# Patient Record
Sex: FEMALE | Race: WHITE | NOT HISPANIC OR LATINO | Employment: UNEMPLOYED | URBAN - METROPOLITAN AREA
[De-identification: names, ages, dates, MRNs, and addresses within clinical notes are randomized per-mention and may not be internally consistent; named-entity substitution may affect disease eponyms.]

---

## 2017-04-19 ENCOUNTER — OFFICE VISIT (OUTPATIENT)
Dept: SLEEP MEDICINE | Facility: HOSPITAL | Age: 62
End: 2017-04-19

## 2017-04-19 VITALS
HEIGHT: 63 IN | DIASTOLIC BLOOD PRESSURE: 78 MMHG | BODY MASS INDEX: 31.89 KG/M2 | HEART RATE: 70 BPM | OXYGEN SATURATION: 94 % | WEIGHT: 180 LBS | SYSTOLIC BLOOD PRESSURE: 146 MMHG

## 2017-04-19 DIAGNOSIS — G47.33 OBSTRUCTIVE SLEEP APNEA, ADULT: Primary | ICD-10-CM

## 2017-04-19 PROCEDURE — 99213 OFFICE O/P EST LOW 20 MIN: CPT | Performed by: INTERNAL MEDICINE

## 2017-04-19 RX ORDER — PREDNISONE 10 MG/1
TABLET ORAL
COMMUNITY
Start: 2017-02-22

## 2017-04-19 NOTE — PATIENT INSTRUCTIONS
Sleep Apnea  Sleep apnea is a condition in which breathing pauses or becomes shallow during sleep. Episodes of sleep apnea usually last 10 seconds or longer, and they may occur as many as 20 times an hour. Sleep apnea disrupts your sleep and keeps your body from getting the rest that it needs. This condition can increase your risk of certain health problems, including:  · Heart attack.  · Stroke.  · Obesity.  · Diabetes.  · Heart failure.  · Irregular heartbeat.  There are three kinds of sleep apnea:  · Obstructive sleep apnea. This kind is caused by a blocked or collapsed airway.  · Central sleep apnea. This kind happens when the part of the brain that controls breathing does not send the correct signals to the muscles that control breathing.  · Mixed sleep apnea. This is a combination of obstructive and central sleep apnea.  CAUSES  The most common cause of this condition is a collapsed or blocked airway. An airway can collapse or become blocked if:  · Your throat muscles are abnormally relaxed.  · Your tongue and tonsils are larger than normal.  · You are overweight.  · Your airway is smaller than normal.  RISK FACTORS  This condition is more likely to develop in people who:  · Are overweight.  · Smoke.  · Have a smaller than normal airway.  · Are elderly.  · Are male.  · Drink alcohol.  · Take sedatives or tranquilizers.  · Have a family history of sleep apnea.  SYMPTOMS  Symptoms of this condition include:  · Trouble staying asleep.  · Daytime sleepiness and tiredness.  · Irritability.  · Loud snoring.  · Morning headaches.  · Trouble concentrating.  · Forgetfulness.  · Decreased interest in sex.  · Unexplained sleepiness.  · Mood swings.  · Personality changes.  · Feelings of depression.  · Waking up often during the night to urinate.  · Dry mouth.  · Sore throat.  DIAGNOSIS  This condition may be diagnosed with:  · A medical history.  · A physical exam.  · A series of tests that are done while you are  sleeping (sleep study). These tests are usually done in a sleep lab, but they may also be done at home.  TREATMENT  Treatment for this condition aims to restore normal breathing and to ease symptoms during sleep. It may involve managing health issues that can affect breathing, such as high blood pressure or obesity. Treatment may include:  · Sleeping on your side.  · Using a decongestant if you have nasal congestion.  · Avoiding the use of depressants, including alcohol, sedatives, and narcotics.  · Losing weight if you are overweight.  · Making changes to your diet.  · Quitting smoking.  · Using a device to open your airway while you sleep, such as:    An oral appliance. This is a custom-made mouthpiece that shifts your lower jaw forward.    A continuous positive airway pressure (CPAP) device. This device delivers oxygen to your airway through a mask.    A nasal expiratory positive airway pressure (EPAP) device. This device has valves that you put into each nostril.    A bi-level positive airway pressure (BPAP) device. This device delivers oxygen to your airway through a mask.  · Surgery if other treatments do not work. During surgery, excess tissue is removed to create a wider airway.  It is important to get treatment for sleep apnea. Without treatment, this condition can lead to:  · High blood pressure.  · Coronary artery disease.  · (Men) An inability to achieve or maintain an erection (impotence).  · Reduced thinking abilities.  HOME CARE INSTRUCTIONS  · Make any lifestyle changes that your health care provider recommends.  · Eat a healthy, well-balanced diet.  · Take over-the-counter and prescription medicines only as told by your health care provider.  · Avoid using depressants, including alcohol, sedatives, and narcotics.  · Take steps to lose weight if you are overweight.  · If you were given a device to open your airway while you sleep, use it only as told by your health care provider.  · Do not use any  tobacco products, such as cigarettes, chewing tobacco, and e-cigarettes. If you need help quitting, ask your health care provider.  · Keep all follow-up visits as told by your health care provider. This is important.  SEEK MEDICAL CARE IF:  · The device that you received to open your airway during sleep is uncomfortable or does not seem to be working.  · Your symptoms do not improve.  · Your symptoms get worse.  SEEK IMMEDIATE MEDICAL CARE IF:  · You develop chest pain.  · You develop shortness of breath.  · You develop discomfort in your back, arms, or stomach.  · You have trouble speaking.  · You have weakness on one side of your body.  · You have drooping in your face.  These symptoms may represent a serious problem that is an emergency. Do not wait to see if the symptoms will go away. Get medical help right away. Call your local emergency services (911 in the [place] Do not drive yourself to the hospital.     This information is not intended to replace advice given to you by your health care provider. Make sure you discuss any questions you have with your health care provider.     Document Released: 12/08/2003 Document Revised: 01/13/2017 Document Reviewed: 09/26/2016  [employer] Patient Education ©2016 [employer]

## 2017-04-19 NOTE — PROGRESS NOTES
"Subjective   Cecilia Salcedo is a 61 y.o. female is here today for follow-up.  She is seen follow-up for severe obstructive sleep apnea.  Her primary care physician is now Dr. Felix Erickson    History of Present Illness  Patient was last seen in this clinic June 13, 2016.  She did sound have severe obstructive sleep apnea on her previous sleep study she also had periodic limb movement disorder and obesity.  She's been using her machine says she's doing fairly well with that she has difficulty wearing the mask when she is congested.  She uses a nasal mask.  She says when she sleeping at night she gets hot due to her mattress and sometimes causes her to awaken.  The following portions of the patient's history were reviewed and updated as appropriate: allergies, current medications and problem list.    Review of Systems   Constitutional: Negative.    HENT: Positive for congestion, postnasal drip and sinus pressure.    Eyes: Positive for visual disturbance.   Respiratory: Negative.    Cardiovascular: Negative.    Gastrointestinal: Negative.    Endocrine: Negative.    Genitourinary: Negative.    Musculoskeletal: Positive for arthralgias and joint swelling.   Skin: Negative.    Allergic/Immunologic: Positive for environmental allergies.   Neurological: Negative.    Hematological: Negative.    Psychiatric/Behavioral: Negative.        Objective  /78  Pulse 70  Ht 63\" (160 cm)  Wt 180 lb (81.6 kg)  SpO2 94%  BMI 31.89 kg/m2    Physical Exam   Constitutional: She is oriented to person, place, and time. She appears well-developed and well-nourished.   She is obese.   HENT:   Head: Normocephalic and atraumatic.   His nasal airway narrowing and Mallampati class IV anatomy   Eyes: EOM are normal. Pupils are equal, round, and reactive to light.   Neck: Normal range of motion. Neck supple.   Cardiovascular: Normal rate, regular rhythm and normal heart sounds.    Pulmonary/Chest: Effort normal and breath sounds normal. "   Abdominal: Soft. Bowel sounds are normal.   Musculoskeletal: Normal range of motion. She exhibits no edema.   Neurological: She is alert and oriented to person, place, and time.   Skin: Skin is warm and dry.   Psychiatric: She has a normal mood and affect. Her behavior is normal.     Download from her machine for the past 6 months shows usage 88.3% the time.  She is using it 6 hours 26 minutes per day.  Her 90% pressure is 12.3 her AHI is normal at 3.7.    Assessment/Plan   Cecilia was seen today for follow-up.    Diagnoses and all orders for this visit:    Obstructive sleep apnea, adult  -     CPAP Therapy    Patient seems doing fairly well with her machine we will continue on her current pressure settings.  We will order new supplies.  She is unhappy with her current DME company and his asking about changing to an another supplier.  I told her we would be happy to facilitate that if possible.  We will order new supplies.  We will see her back in 1 year.  She is encouraged to lose weight.  She is encouraged to avoid alcohol and sedatives close to bedtime.  She is encouraged practice lateral position sleep.  She is planning a trip abroad later this year and did not wish to take her CPAP machine.  I've encouraged her to practice strict lateral position sleep and to talk to her dentist about getting an oral appliance.    Tae Morris MD [facility]  Sleep Medicine  Pulmonary and Critical Care Medicine

## 2017-04-21 ENCOUNTER — DOCUMENTATION (OUTPATIENT)
Dept: SLEEP MEDICINE | Facility: HOSPITAL | Age: 62
End: 2017-04-21

## 2017-04-21 NOTE — PROGRESS NOTES
When patient was in office she informed us she would like to switch DME companies. Sent new patient order to [facility].

## 2018-01-05 ENCOUNTER — TRANSCRIBE ORDERS (OUTPATIENT)
Dept: LAB | Facility: HOSPITAL | Age: 63
End: 2018-01-05

## 2018-01-05 ENCOUNTER — LAB (OUTPATIENT)
Dept: LAB | Facility: HOSPITAL | Age: 63
End: 2018-01-05

## 2018-01-05 DIAGNOSIS — Z12.31 VISIT FOR SCREENING MAMMOGRAM: ICD-10-CM

## 2018-01-05 DIAGNOSIS — R31.9 HEMATURIA, UNSPECIFIED TYPE: ICD-10-CM

## 2018-01-05 DIAGNOSIS — R31.9 HEMATURIA, UNSPECIFIED TYPE: Primary | ICD-10-CM

## 2018-01-05 LAB
BACTERIA UR QL AUTO: NORMAL /HPF
BILIRUB UR QL STRIP: NEGATIVE
CLARITY UR: CLEAR
COLOR UR: YELLOW
GLUCOSE UR STRIP-MCNC: NEGATIVE MG/DL
HGB UR QL STRIP.AUTO: ABNORMAL
HYALINE CASTS UR QL AUTO: NORMAL /LPF
KETONES UR QL STRIP: NEGATIVE
LEUKOCYTE ESTERASE UR QL STRIP.AUTO: NEGATIVE
NITRITE UR QL STRIP: NEGATIVE
PH UR STRIP.AUTO: 6 [PH] (ref 5–8)
PROT UR QL STRIP: NEGATIVE
RBC # UR: NORMAL /HPF
REF LAB TEST METHOD: NORMAL
SP GR UR STRIP: 1.01 (ref 1–1.03)
SQUAMOUS #/AREA URNS HPF: NORMAL /HPF
UROBILINOGEN UR QL STRIP: ABNORMAL
WBC UR QL AUTO: NORMAL /HPF

## 2018-01-05 PROCEDURE — 87086 URINE CULTURE/COLONY COUNT: CPT

## 2018-01-05 PROCEDURE — 81001 URINALYSIS AUTO W/SCOPE: CPT

## 2018-01-07 LAB — BACTERIA SPEC AEROBE CULT: NORMAL

## 2018-02-08 ENCOUNTER — HOSPITAL ENCOUNTER (OUTPATIENT)
Dept: MAMMOGRAPHY | Facility: HOSPITAL | Age: 63
Discharge: HOME | End: 2018-02-08
Attending: OBSTETRICS & GYNECOLOGY | Admitting: OBSTETRICS & GYNECOLOGY

## 2018-02-08 DIAGNOSIS — Z12.31 VISIT FOR SCREENING MAMMOGRAM: ICD-10-CM

## 2018-02-08 PROCEDURE — 77063 BREAST TOMOSYNTHESIS BI: CPT | Performed by: RADIOLOGY

## 2018-02-08 PROCEDURE — 77063 BREAST TOMOSYNTHESIS BI: CPT

## 2018-02-08 PROCEDURE — 77067 SCR MAMMO BI INCL CAD: CPT | Performed by: RADIOLOGY

## 2018-02-08 PROCEDURE — 77067 SCR MAMMO BI INCL CAD: CPT

## 2018-02-12 ENCOUNTER — TRANSCRIBE ORDERS (OUTPATIENT)
Dept: LAB | Facility: HOSPITAL | Age: 63
End: 2018-02-12

## 2018-02-12 ENCOUNTER — LAB (OUTPATIENT)
Dept: LAB | Facility: HOSPITAL | Age: 63
End: 2018-02-12

## 2018-02-12 DIAGNOSIS — Z00.00 WELL FEMALE EXAM WITHOUT GYNECOLOGICAL EXAM: Primary | ICD-10-CM

## 2018-02-12 DIAGNOSIS — Z00.00 WELL FEMALE EXAM WITHOUT GYNECOLOGICAL EXAM: ICD-10-CM

## 2018-02-12 LAB
BACTERIA UR QL AUTO: ABNORMAL /HPF
BILIRUB UR QL STRIP: NEGATIVE
CLARITY UR: ABNORMAL
COLOR UR: YELLOW
GLUCOSE UR STRIP-MCNC: NEGATIVE MG/DL
HGB UR QL STRIP.AUTO: NEGATIVE
HYALINE CASTS UR QL AUTO: ABNORMAL /LPF
KETONES UR QL STRIP: NEGATIVE
LEUKOCYTE ESTERASE UR QL STRIP.AUTO: NEGATIVE
NITRITE UR QL STRIP: NEGATIVE
PH UR STRIP.AUTO: <=5 [PH] (ref 5–8)
PROT UR QL STRIP: NEGATIVE
RBC # UR: ABNORMAL /HPF
REF LAB TEST METHOD: ABNORMAL
SP GR UR STRIP: 1.02 (ref 1–1.03)
SQUAMOUS #/AREA URNS HPF: ABNORMAL /HPF
UROBILINOGEN UR QL STRIP: ABNORMAL
WBC UR QL AUTO: ABNORMAL /HPF

## 2018-02-12 PROCEDURE — 81001 URINALYSIS AUTO W/SCOPE: CPT

## 2018-03-05 ENCOUNTER — OFFICE VISIT (OUTPATIENT)
Dept: ORTHOPEDIC SURGERY | Facility: CLINIC | Age: 63
End: 2018-03-05

## 2018-03-05 VITALS
HEART RATE: 68 BPM | WEIGHT: 177.69 LBS | DIASTOLIC BLOOD PRESSURE: 81 MMHG | BODY MASS INDEX: 30.34 KG/M2 | HEIGHT: 64 IN | SYSTOLIC BLOOD PRESSURE: 153 MMHG

## 2018-03-05 DIAGNOSIS — S82.434D CLOSED NONDISPLACED OBLIQUE FRACTURE OF SHAFT OF RIGHT FIBULA WITH ROUTINE HEALING, SUBSEQUENT ENCOUNTER: Primary | ICD-10-CM

## 2018-03-05 PROCEDURE — 99203 OFFICE O/P NEW LOW 30 MIN: CPT | Performed by: ORTHOPAEDIC SURGERY

## 2018-03-05 RX ORDER — ESTRADIOL 0.1 MG/G
CREAM VAGINAL
COMMUNITY
Start: 2018-02-23

## 2018-03-05 RX ORDER — PHENAZOPYRIDINE HYDROCHLORIDE 100 MG/1
TABLET, FILM COATED ORAL
COMMUNITY
Start: 2017-12-28

## 2018-03-05 NOTE — PROGRESS NOTES
[facility] [place] [facility]    Subjective     Chief Complaint   Patient presents with   • Right Knee - Follow-up     Osteoarthritis of (R) knee. 6 month f/u   Pt fell on ice 2018   • Right Hip - Pain     Pt fell on ice 2018        STERLING Salcedo is a 62 y.o. female. She presents today for evaluation of right leg pain, which seemed to be primarily focused on the lateral aspect of the thigh and leg.  She fell on the ice about 6-8 weeks ago, and had the onset of moderate to severe pain, which is improving since that timeframe.  The pain is aching and sharp, associated with swelling and stiffness.  Worsening is noted with walking and climbing stairs.  No previous history of trauma.  She walked with crutches for a couple of weeks, but then discontinued them.  She is walking without external aids today.      Patient Active Problem List   Diagnosis   • MOE (obstructive sleep apnea)   • Snoring   • Periodic limb movement disorder   • Travel advice encounter   • Osteopenia     Past Medical History:   Diagnosis Date   • Daytime somnolence    • Heart murmur    • MOE on CPAP    • Osteopenia    • Primary osteoarthritis of right knee    • Sinusitis       Past Surgical History:   Procedure Laterality Date   • BREAST CYST ASPIRATION     •  SECTION     • HERNIA REPAIR     • HYSTERECTOMY      Total   • OOPHORECTOMY        Family History   Problem Relation Age of Onset   • Heart attack Other      acute   • Skin cancer Other    • Heart attack Other      acute   • Skin cancer Other    • Heart attack Other      acute   • Skin cancer Other    • Depression Mother    • Bleeding Disorder Mother    • Depression Father    • Depression Other    • Stroke Other    • Breast cancer Neg Hx    • Ovarian cancer Neg Hx      Social History     Social History   • Marital status:      Spouse name: N/A   • Number of children: N/A   • Years of education: N/A     Occupational History   • Not on file.      Social History Main Topics   • Smoking status: Former Smoker     Packs/day: 2.00     Years: 8.00     Types: Cigarettes   • Smokeless tobacco: Never Used   • Alcohol use No   • Drug use: No   • Sexual activity: Yes     Partners: Male     Other Topics Concern   • Not on file     Social History Narrative      Current Outpatient Prescriptions on File Prior to Visit   Medication Sig Dispense Refill   • albuterol (PROVENTIL HFA;VENTOLIN HFA) 108 (90 BASE) MCG/ACT inhaler Inhale 2 puffs Every 4 (Four) Hours As Needed for wheezing or shortness of air. 1 inhaler 11   • azithromycin (ZITHROMAX Z-HAZEL) 250 MG tablet Take 2 tablets the first day, then 1 tablet daily for 4 days. 6 tablet 0   • Calcium Carbonate (CALCIUM 600 PO) Take  by mouth.     • Cetirizine HCl (ZYRTEC ALLERGY) 10 MG capsule Take  by mouth.     • EPINEPHrine (EPIPEN 2-HAZEL) 0.3 MG/0.3ML solution auto-injector injection      • Multiple Vitamin (MULTI-VITAMIN PO) Take  by mouth.     • predniSONE (DELTASONE) 10 MG tablet      • sulfamethoxazole-trimethoprim (BACTRIM DS,SEPTRA DS) 800-160 MG per tablet Take 1 tablet by mouth 2 (Two) Times a Day. 14 tablet 0   • EPINEPHrine (EPIPEN 2-HAZEL) 0.3 MG/0.3ML solution auto-injector injection Inject 0.3 mL into the shoulder, thigh, or buttocks 1 (one) time for 1 dose. Inject intramuscularly as directed. 1 each 3     No current facility-administered medications on file prior to visit.       Allergies   Allergen Reactions   • Bee Venom    • Doxycycline    • Penicillins         Review of Systems   Constitutional: Negative for activity change, appetite change, chills, diaphoresis, fatigue, fever and unexpected weight change.   HENT: Negative for congestion, dental problem, drooling, ear discharge, ear pain, facial swelling, hearing loss, mouth sores, nosebleeds, postnasal drip, rhinorrhea, sinus pressure, sneezing, sore throat, tinnitus, trouble swallowing and voice change.    Eyes: Negative for photophobia, pain, discharge,  "redness, itching and visual disturbance.   Respiratory: Positive for apnea. Negative for cough, choking, chest tightness, shortness of breath, wheezing and stridor.    Cardiovascular: Negative for chest pain, palpitations and leg swelling.   Gastrointestinal: Negative for abdominal distention, abdominal pain, anal bleeding, blood in stool, constipation, diarrhea, nausea, rectal pain and vomiting.   Endocrine: Negative for cold intolerance, heat intolerance, polydipsia, polyphagia and polyuria.   Genitourinary: Negative for decreased urine volume, difficulty urinating, dysuria, enuresis, flank pain, frequency, genital sores, hematuria and urgency.   Musculoskeletal: Positive for joint swelling. Negative for arthralgias, back pain, gait problem, myalgias, neck pain and neck stiffness.        Joint Pain    Skin: Negative for color change, pallor, rash and wound.   Allergic/Immunologic: Negative for environmental allergies, food allergies and immunocompromised state.   Neurological: Negative for dizziness, tremors, seizures, syncope, facial asymmetry, speech difficulty, weakness, light-headedness, numbness and headaches.   Hematological: Negative for adenopathy. Does not bruise/bleed easily.   Psychiatric/Behavioral: Negative for agitation, behavioral problems, confusion, decreased concentration, dysphoric mood, hallucinations, self-injury, sleep disturbance and suicidal ideas. The patient is not nervous/anxious and is not hyperactive.         Objective      Physical Exam  /81  Pulse 68  Ht 162 cm (63.78\")  Wt 80.6 kg (177 lb 11.1 oz)  BMI 30.71 kg/m2    Body mass index is 30.71 kg/(m^2).    General:   Mental Status:  Alert   Appearance: Cooperative, in no acute distress   Build and Nutrition: Well-nourished and well developed female   Orientation: Alert and oriented to person, place and time   Posture: Normal   Gait: Normal    Integument:   Right hip, thigh, knee, and leg: No skin lesions, no rash, no " ecchymosis    Neurologic:   Sensation:    Right foot: Intact to light touch on the dorsal and plantar aspect   Motor:  Right lower extremity: 5/5 quadriceps, hamstrings, ankle dorsiflexors, and ankle plantar flexors    Vascular:   Right lower extremity: 2+ dorsalis pedis pulse, prompt capillary refill    Lower Extremities:   Right Hip, thigh, knee and leg:    Tenderness:  Tender over the lateral aspect of the thigh over the iliotibial band region, but no fibular     tenderness    Swelling: None    Crepitus:  None    Atrophy:  None    Range of motion: Full range of motion of the hip and knee, as well as the ankle   Instability:  None  Deformities:  None  Functional testing: Negative Stinchfield    No leg length discrepancy      Imaging/Studies  Imaging Results (last 24 hours)     Procedure Component Value Units Date/Time    XR Tibia Fibula 2 View Right [[id number]] Resulted:  03/05/18 1649     Updated:  03/05/18 1651    Narrative:       Right Tibia/Fibula Radiographs  Indication: pain  Views: AP and lateral views of the right tibia and fibula    Comparison: no prior studies available for review    Findings:  Proximal fibular fracture, proximal one third of the fibular shaft, with   good callus formation, but incomplete healing.        XR Hip With or Without Pelvis 1 View Right [[id number]] Resulted:  03/05/18 1651     Updated:  03/05/18 1653    Narrative:       Right Hip Radiographs  Indication: right hip pain  Views: low AP pelvis and lateral of the right hip    Comparison: no prior studies available for review    Findings:   Mild arthritic changes are seen, with mild joint space narrowing,   thickening along the femoral neck, and base of neck osteophytes seen on   the lateral view.  No acute bony abnormalities are appreciated.    XR Knee 4+ View Right [[id number]] Resulted:  03/05/18 1653     Updated:  03/05/18 1654    Narrative:       RIght Knee Radiographs  Indication: right knee pain  Views: Standing AP's and skiers  of both knees, with lateral and sunrise   views of the right knee    Comparison: no prior studies available    Findings:   Medial joint space narrowing, peaking of the tibial spines, proximal   tibiofibular joint arthritic changes, with patellofemoral spurring   consistent with arthritis, with a proximal fibula fracture in the stages   of healing.  Please see the x-ray report for the tibia and fibula for   further details of the fracture.            Assessment and Plan     Cecilia was seen today for follow-up and pain.    Diagnoses and all orders for this visit:    Closed nondisplaced oblique fracture of shaft of right fibula with routine healing, subsequent encounter  -     XR Knee 4+ View Right  -     XR Hip With or Without Pelvis 1 View Right  -     XR Tibia Fibula 2 View Right        I reviewed my findings with patient today.  She had a fall on the ice about 6-8 weeks ago, with the onset of pain.  She walked with crutches for 2 weeks.  Radiographically, she has a proximal fibula fracture.  I suspect that the other pains are from either deep contusions or from avoidance of full weightbearing because of the fracture, with compensation at the hip and the knee.  I suspect that those problems will improve, and the fracture of the proximal fibula in the proximal one third of the shaft is healing.  I will see her back in 6 weeks with a repeat x-ray.  I will see her back sooner for any problems.  Further imaging may be considered if there are any unusual features radiographically concerning healing.  We may also consider further workup of the hip and knee pain, but once again I believe that this is compensatory and accommodative for the fracture.        Medical Decision Making  Management Options : close treatment of fracture or dislocation  Data/Risk: radiology tests and independent visualization of imaging, lab tests, or EMG/NCV      Edil Gambino MD  03/05/18  5:55 PM

## 2018-04-18 ENCOUNTER — OFFICE VISIT (OUTPATIENT)
Dept: ORTHOPEDIC SURGERY | Facility: CLINIC | Age: 63
End: 2018-04-18

## 2018-04-18 VITALS
HEIGHT: 64 IN | BODY MASS INDEX: 30.11 KG/M2 | HEART RATE: 87 BPM | WEIGHT: 176.37 LBS | SYSTOLIC BLOOD PRESSURE: 162 MMHG | DIASTOLIC BLOOD PRESSURE: 89 MMHG

## 2018-04-18 DIAGNOSIS — S82.434D CLOSED NONDISPLACED OBLIQUE FRACTURE OF SHAFT OF RIGHT FIBULA WITH ROUTINE HEALING, SUBSEQUENT ENCOUNTER: Primary | ICD-10-CM

## 2018-04-18 DIAGNOSIS — G89.29 CHRONIC PAIN OF RIGHT KNEE: ICD-10-CM

## 2018-04-18 DIAGNOSIS — M25.561 CHRONIC PAIN OF RIGHT KNEE: ICD-10-CM

## 2018-04-18 DIAGNOSIS — G89.29 CHRONIC RIGHT HIP PAIN: ICD-10-CM

## 2018-04-18 DIAGNOSIS — M25.551 CHRONIC RIGHT HIP PAIN: ICD-10-CM

## 2018-04-18 PROCEDURE — 99214 OFFICE O/P EST MOD 30 MIN: CPT | Performed by: ORTHOPAEDIC SURGERY

## 2018-04-18 NOTE — PROGRESS NOTES
[facility] Note    Subjective     Chief Complaint   Patient presents with   • Follow-up     6 week f/u Closed nondisplaced oblique fracture of shaft of right fibula - DOI: 2018        HPI    Cecilia Salcedo is a 62 y.o. female. She follows up today primarily for the right fibular shaft fracture, but continues to have right lateral hip pain, and right knee pain.  This all seemed to flareup when she fell on the ice in 2018.  The pain is moderate to severe, aching in quality, so she was stiffness and giving way.  It worsens with climbing stairs.  She had some relief since her last visit, but continues to be bothered with the hip and knee pain in particular.  The leg pain has improved.      Patient Active Problem List   Diagnosis   • MOE (obstructive sleep apnea)   • Snoring   • Periodic limb movement disorder   • Travel advice encounter   • Osteopenia     Past Medical History:   Diagnosis Date   • Daytime somnolence    • Heart murmur    • MOE on CPAP    • Osteopenia    • Primary osteoarthritis of right knee    • Sinusitis       Past Surgical History:   Procedure Laterality Date   • BREAST CYST ASPIRATION     •  SECTION     • HERNIA REPAIR     • HYSTERECTOMY      Total   • OOPHORECTOMY        Family History   Problem Relation Age of Onset   • Heart attack Other      acute   • Skin cancer Other    • Heart attack Other      acute   • Skin cancer Other    • Heart attack Other      acute   • Skin cancer Other    • Depression Mother    • Bleeding Disorder Mother    • Depression Father    • Depression Other    • Stroke Other    • Breast cancer Neg Hx    • Ovarian cancer Neg Hx      Social History     Social History   • Marital status:      Spouse name: N/A   • Number of children: N/A   • Years of education: N/A     Occupational History   • Not on file.     Social History Main Topics   • Smoking status: Former Smoker     Packs/day: 2.00     Years: 8.00     Types: Cigarettes    • Smokeless tobacco: Never Used   • Alcohol use No   • Drug use: No   • Sexual activity: Yes     Partners: Male     Other Topics Concern   • Not on file     Social History Narrative   • No narrative on file      Current Outpatient Prescriptions on File Prior to Visit   Medication Sig Dispense Refill   • albuterol (PROVENTIL HFA;VENTOLIN HFA) 108 (90 BASE) MCG/ACT inhaler Inhale 2 puffs Every 4 (Four) Hours As Needed for wheezing or shortness of air. 1 inhaler 11   • azithromycin (ZITHROMAX Z-HAZEL) 250 MG tablet Take 2 tablets the first day, then 1 tablet daily for 4 days. 6 tablet 0   • Calcium Carbonate (CALCIUM 600 PO) Take  by mouth.     • Cetirizine HCl (ZYRTEC ALLERGY) 10 MG capsule Take  by mouth.     • EPINEPHrine (EPIPEN 2-HAZEL) 0.3 MG/0.3ML solution auto-injector injection      • ESTRACE VAGINAL 0.1 MG/GM vaginal cream      • Multiple Vitamin (MULTI-VITAMIN PO) Take  by mouth.     • phenazopyridine (PYRIDIUM) 100 MG tablet      • predniSONE (DELTASONE) 10 MG tablet      • sulfamethoxazole-trimethoprim (BACTRIM DS,SEPTRA DS) 800-160 MG per tablet Take 1 tablet by mouth 2 (Two) Times a Day. 14 tablet 0   • EPINEPHrine (EPIPEN 2-HAZEL) 0.3 MG/0.3ML solution auto-injector injection Inject 0.3 mL into the shoulder, thigh, or buttocks 1 (one) time for 1 dose. Inject intramuscularly as directed. 1 each 3     No current facility-administered medications on file prior to visit.       Allergies   Allergen Reactions   • Bee Venom    • Doxycycline    • Penicillins         Review of Systems   Constitutional: Positive for fever.   HENT: Negative.    Eyes: Negative.    Respiratory: Positive for apnea.    Cardiovascular: Negative.    Gastrointestinal: Negative.    Endocrine: Negative.    Genitourinary: Negative.    Musculoskeletal: Positive for arthralgias (Right leg pain).   Skin: Negative.    Allergic/Immunologic: Positive for environmental allergies.   Neurological: Negative.    Hematological: Negative.   "  Psychiatric/Behavioral: Negative.         Objective      Physical Exam  /89   Pulse 87   Ht 162 cm (63.78\")   Wt 80 kg (176 lb 5.9 oz)   BMI 30.48 kg/m²     Body mass index is 30.48 kg/m².    General:   Mental Status:  Alert   Appearance: Cooperative, in no acute distress   Build and Nutrition: Overweight female   Orientation: Alert and oriented to person, place and time   Posture: Normal   Gait: Limping on the right    Integument:   Right knee: No skin lesions, no rash, no ecchymosis    Lower Extremity:   Right Hip:    Tenderness:  Tender over the lateral aspect of the hip    Swelling:  None    Crepitus:  None    Range of motion:  External Rotation: 30°       Internal Rotation: 30°       Flexion:  100°       Extension:  0°    Deformities:  None  Functional testing: Negative Stinchfield    No leg length discrepancy    Lower Extremities:   Right Knee:    Tenderness:  Medial and lateral joint line tenderness, but no tenderness over the fibula    Effusion:  None    Swelling: None    Crepitus:  Positive    Range of motion:  Extension: 0°       Flexion: 110°  Instability:  No varus laxity, no valgus laxity, negative anterior drawer  Deformities:  None  Negative squeeze test      Imaging/Studies  Imaging Results (last 24 hours)     Procedure Component Value Units Date/Time    XR Tibia Fibula 2 View Right [[id number]] Resulted:  04/18/18 1726     Updated:  04/18/18 1727    Narrative:       Right Tibia/Fibula Radiographs  Indication: pain  Views: AP and lateral views of the right tibia and fibula    Comparison: 3/5/2018    Findings:  The fibula fracture proximally is healing, in good position, with good new   bone formation, which has improved compared to previous films.                Assessment and Plan     Cecilia was seen today for follow-up.    Diagnoses and all orders for this visit:    Closed nondisplaced oblique fracture of shaft of right fibula with routine healing, subsequent encounter  -     XR Tibia " Fibula 2 View Right    Chronic right hip pain  -     MRI Hip Right Without Contrast; Future    Chronic pain of right knee  -     MRI Knee Right Without Contrast; Future        I reviewed my findings with patient today.  The fibular shaft fracture appears to be healing well, and I anticipate further healing radiographically.  Clinically she is nontender in that area.  However, she is having pain in the hip and knee.  This may be compensatory, however at this point it seems to be persistent, and have recommended an MRI of her right hip and right knee.  She does have a known history of arthritis in the right knee.  Previous radiographs of the right hip show no acute bony abnormalities.  I will see her back after completion of the MRIs.    Return for After Imaging Study.      Medical Decision Making  Data/Risk: radiology tests and independent visualization of imaging, lab tests, or EMG/NCV      Edil Gambino MD  04/18/18  6:18 PM

## 2018-04-30 ENCOUNTER — HOSPITAL ENCOUNTER (OUTPATIENT)
Dept: MRI IMAGING | Facility: HOSPITAL | Age: 63
Discharge: HOME | End: 2018-04-30
Attending: ORTHOPAEDIC SURGERY

## 2018-04-30 ENCOUNTER — HOSPITAL ENCOUNTER (OUTPATIENT)
Dept: MRI IMAGING | Facility: HOSPITAL | Age: 63
Discharge: HOME | End: 2018-04-30
Attending: ORTHOPAEDIC SURGERY | Admitting: ORTHOPAEDIC SURGERY

## 2018-04-30 DIAGNOSIS — M25.561 CHRONIC PAIN OF RIGHT KNEE: ICD-10-CM

## 2018-04-30 DIAGNOSIS — G89.29 CHRONIC PAIN OF RIGHT KNEE: ICD-10-CM

## 2018-04-30 DIAGNOSIS — G89.29 CHRONIC RIGHT HIP PAIN: ICD-10-CM

## 2018-04-30 DIAGNOSIS — M25.551 CHRONIC RIGHT HIP PAIN: ICD-10-CM

## 2018-04-30 PROCEDURE — 73721 MRI JNT OF LWR EXTRE W/O DYE: CPT

## 2018-05-02 ENCOUNTER — OFFICE VISIT (OUTPATIENT)
Dept: ORTHOPEDIC SURGERY | Facility: CLINIC | Age: 63
End: 2018-05-02

## 2018-05-02 VITALS
SYSTOLIC BLOOD PRESSURE: 167 MMHG | HEART RATE: 69 BPM | BODY MASS INDEX: 30.56 KG/M2 | WEIGHT: 179.01 LBS | HEIGHT: 64 IN | DIASTOLIC BLOOD PRESSURE: 86 MMHG

## 2018-05-02 DIAGNOSIS — G89.29 CHRONIC RIGHT HIP PAIN: ICD-10-CM

## 2018-05-02 DIAGNOSIS — M25.551 CHRONIC RIGHT HIP PAIN: ICD-10-CM

## 2018-05-02 DIAGNOSIS — M23.203 OLD COMPLEX TEAR OF MEDIAL MENISCUS OF RIGHT KNEE: Primary | ICD-10-CM

## 2018-05-02 PROCEDURE — 99213 OFFICE O/P EST LOW 20 MIN: CPT | Performed by: ORTHOPAEDIC SURGERY

## 2018-05-02 NOTE — PROGRESS NOTES
[facility] [place]    Subjective     Chief Complaint   Patient presents with   • Right Knee - Follow-up     Post MRI   • Right Hip - Follow-up     Post MRI        HPI    Cecilia Salcedo is a 62 y.o. female. She follows up today for her right hip and right knee.  MRIs have been completed.  She has persistent pain, which is moderate to severe, aching and sharp, associated with stiffness and giving way.  It worsens with climbing stairs in her knee, and when she lays down in the hip.      Patient Active Problem List   Diagnosis   • MOE (obstructive sleep apnea)   • Snoring   • Periodic limb movement disorder   • Travel advice encounter   • Osteopenia     Past Medical History:   Diagnosis Date   • Daytime somnolence    • Heart murmur    • MOE on CPAP    • Osteopenia    • Primary osteoarthritis of right knee    • Sinusitis       Past Surgical History:   Procedure Laterality Date   • BREAST CYST ASPIRATION     •  SECTION     • HERNIA REPAIR     • HYSTERECTOMY      Total   • OOPHORECTOMY        Family History   Problem Relation Age of Onset   • Heart attack Other      acute   • Skin cancer Other    • Heart attack Other      acute   • Skin cancer Other    • Heart attack Other      acute   • Skin cancer Other    • Depression Mother    • Bleeding Disorder Mother    • Depression Father    • Depression Other    • Stroke Other    • Breast cancer Neg Hx    • Ovarian cancer Neg Hx      Social History     Social History   • Marital status:      Spouse name: N/A   • Number of children: N/A   • Years of education: N/A     Occupational History   • Not on file.     Social History Main Topics   • Smoking status: Former Smoker     Packs/day: 2.00     Years: 8.00     Types: Cigarettes   • Smokeless tobacco: Never Used   • Alcohol use No   • Drug use: No   • Sexual activity: Yes     Partners: Male     Other Topics Concern   • Not on file     Social History Narrative   • No narrative on file      Current  Outpatient Prescriptions on File Prior to Visit   Medication Sig Dispense Refill   • albuterol (PROVENTIL HFA;VENTOLIN HFA) 108 (90 BASE) MCG/ACT inhaler Inhale 2 puffs Every 4 (Four) Hours As Needed for wheezing or shortness of air. 1 inhaler 11   • azithromycin (ZITHROMAX Z-HAZEL) 250 MG tablet Take 2 tablets the first day, then 1 tablet daily for 4 days. 6 tablet 0   • Calcium Carbonate (CALCIUM 600 PO) Take  by mouth.     • Cetirizine HCl (ZYRTEC ALLERGY) 10 MG capsule Take  by mouth.     • EPINEPHrine (EPIPEN 2-HAZEL) 0.3 MG/0.3ML solution auto-injector injection      • ESTRACE VAGINAL 0.1 MG/GM vaginal cream      • Multiple Vitamin (MULTI-VITAMIN PO) Take  by mouth.     • phenazopyridine (PYRIDIUM) 100 MG tablet      • predniSONE (DELTASONE) 10 MG tablet      • sulfamethoxazole-trimethoprim (BACTRIM DS,SEPTRA DS) 800-160 MG per tablet Take 1 tablet by mouth 2 (Two) Times a Day. 14 tablet 0   • EPINEPHrine (EPIPEN 2-HAZEL) 0.3 MG/0.3ML solution auto-injector injection Inject 0.3 mL into the shoulder, thigh, or buttocks 1 (one) time for 1 dose. Inject intramuscularly as directed. 1 each 3     No current facility-administered medications on file prior to visit.       Allergies   Allergen Reactions   • Bee Venom    • Doxycycline    • Penicillins         Review of Systems   Constitutional: Negative for activity change, appetite change, chills, diaphoresis, fatigue, fever and unexpected weight change.   HENT: Negative for congestion, dental problem, drooling, ear discharge, ear pain, facial swelling, hearing loss, mouth sores, nosebleeds, postnasal drip, rhinorrhea, sinus pressure, sneezing, sore throat, tinnitus, trouble swallowing and voice change.    Eyes: Negative for photophobia, pain, discharge, redness, itching and visual disturbance.   Respiratory: Negative for apnea, cough, choking, chest tightness, shortness of breath, wheezing and stridor.    Cardiovascular: Negative for chest pain, palpitations and leg  "swelling.   Gastrointestinal: Negative for abdominal distention, abdominal pain, anal bleeding, blood in stool, constipation, diarrhea, nausea, rectal pain and vomiting.   Endocrine: Negative for cold intolerance, heat intolerance, polydipsia, polyphagia and polyuria.   Genitourinary: Negative for decreased urine volume, difficulty urinating, dysuria, enuresis, flank pain, frequency, genital sores, hematuria and urgency.   Musculoskeletal: Positive for joint swelling. Negative for arthralgias, back pain, gait problem, myalgias, neck pain and neck stiffness.        Joint Pain   Hip Pain    Skin: Negative for color change, pallor, rash and wound.   Allergic/Immunologic: Negative for environmental allergies, food allergies and immunocompromised state.   Neurological: Negative for dizziness, tremors, seizures, syncope, facial asymmetry, speech difficulty, weakness, light-headedness, numbness and headaches.   Hematological: Negative for adenopathy. Does not bruise/bleed easily.   Psychiatric/Behavioral: Negative for agitation, behavioral problems, confusion, decreased concentration, dysphoric mood, hallucinations, self-injury, sleep disturbance and suicidal ideas. The patient is not nervous/anxious and is not hyperactive.         Objective      Physical Exam  /86   Pulse 69   Ht 162 cm (63.78\")   Wt 81.2 kg (179 lb 0.2 oz)   BMI 30.94 kg/m²     Body mass index is 30.94 kg/m².    General:   Mental Status:  Alert   Appearance: Cooperative, in no acute distress   Build and Nutrition: Well-nourished and well developed female   Orientation: Alert and oriented to person, place and time   Posture: Normal   Gait: Normal    Integument:   Right hip: No skin lesions, no rash, no ecchymosis    Lower Extremity:   Right Hip:    Tenderness:  Mild lateral tenderness    Swelling:  None    Crepitus:  None    Range of motion:  External Rotation: 30°       Internal " Rotation: 30°       Flexion:  100°       Extension:  0°    Deformities:  None  Functional testing: Negative Stinchfield    No leg length discrepancy    Integument:   Right knee: No skin lesions, no rash, no ecchymosis    Lower Extremities:   Right Knee:    Tenderness:  Medial joint line tenderness    Effusion:  None    Swelling: None    Crepitus:  Positive    Range of motion:  Extension: 0°       Flexion: 120°  Instability:  No varus laxity, no valgus laxity, negative anterior drawer  Deformities:  None      Imaging/Studies    MRI right knee:  IMPRESSION:  1. Intrasubstance, intrameniscal abnormal signal is seen in the body and  posterior horn of the medial meniscus, however, an acute surface tear or  surface extension is not identified.  2. There is scuffing of the cartilage of the medial and lateral  articular cartilage of the right knee joint.  3. The most impressive finding, however, is the presence of a high-grade  osteochondral lesion with marrow edema and extension to and perhaps  through the cartilage of the posterior midportion of the right patella.  This is likely etiology of the patient's significant pain.  4. Otherwise, the extensor mechanism, cruciate ligaments, collateral  ligaments and lateral menisci are all intact. A few other sporadic  low-grade abnormalities are noted as incidental findings described  above.      D:  04/30/2018  E:  05/01/2018     This report was finalized on 5/1/2018 8:41 AM by Dr. Reid Godinez MD.    MRI right hip:  IMPRESSION:  1. Substantial diffuse edema involving the anterior and middle column  and roof of the right acetabulum and sparing the right femoral head.  Nonetheless, this is significant signal alteration of the superior  anterior aspect of the right acetabulum, significant because the  patient's symptomatology.  2. Otherwise, stress fracture, transient osteoporosis, stress fracture,  osteochondral defect, AVN, or macrofracture is not identified.  3. The superior  labrum of the right hip appears to be intact although  contused with alteration of intrasubstance signal.  4. There is no evidence of pelvic mass or musculoskeletal lesion around  the right hip.  5. There is marked diverticulosis of the sigmoid colon without  inflammation in the pelvis.  6. Lastly, the iliopsoas tendon complexes are intact without  inflammation or tendinopathy.      D:  04/30/2018  E:  05/01/2018     This report was finalized on 5/1/2018 11:58 AM by Dr. Reid Godinez MD.        Assessment and Plan     Cecilia was seen today for follow-up and follow-up.    Diagnoses and all orders for this visit:    Old complex tear of medial meniscus of right knee  -     Ambulatory Referral to Physical Therapy Evaluate and treat    Chronic right hip pain  -     Ambulatory Referral to Physical Therapy Evaluate and treat        I reviewed my findings with patient today.  We reviewed the MRI findings together today.  MRI of the right knee does show some degeneration, as well as a probable medial meniscus tear.  His MRI shows some mild edema in the acetabulum, but otherwise no other unusual features.  Incidental finding of diverticulosis, which she will discuss with her primary care physician.  We will try physical therapy, and a referral was provided today.  Arthroscopy for the knee could be considered in the future.  I will see her back in 6 weeks, but sooner for any problems.    Return in about 6 weeks (around 6/13/2018).      Medical Decision Making  Management Options : physical/occupational therapy  Data/Risk: independent visualization of imaging, lab tests, or EMG/NCV      Edil Gambino MD  05/02/18  9:06 AM

## 2021-09-17 ENCOUNTER — TRANSCRIBE ORDERS (OUTPATIENT)
Dept: ADMINISTRATIVE | Facility: HOSPITAL | Age: 66
End: 2021-09-17

## 2021-09-17 DIAGNOSIS — Z12.31 VISIT FOR SCREENING MAMMOGRAM: ICD-10-CM

## 2021-09-17 DIAGNOSIS — M85.9 DISORDER OF BONE DENSITY AND STRUCTURE, UNSPECIFIED: Primary | ICD-10-CM

## 2022-01-14 ENCOUNTER — HOSPITAL ENCOUNTER (OUTPATIENT)
Dept: BONE DENSITY | Facility: HOSPITAL | Age: 67
Discharge: HOME | End: 2022-01-14

## 2022-01-14 ENCOUNTER — HOSPITAL ENCOUNTER (OUTPATIENT)
Dept: MAMMOGRAPHY | Facility: HOSPITAL | Age: 67
Discharge: HOME | End: 2022-01-14

## 2022-01-14 DIAGNOSIS — M85.9 DISORDER OF BONE DENSITY AND STRUCTURE, UNSPECIFIED: ICD-10-CM

## 2022-01-14 DIAGNOSIS — Z12.31 VISIT FOR SCREENING MAMMOGRAM: ICD-10-CM

## 2022-01-14 PROCEDURE — 77063 BREAST TOMOSYNTHESIS BI: CPT

## 2022-01-14 PROCEDURE — 77080 DXA BONE DENSITY AXIAL: CPT

## 2022-01-14 PROCEDURE — 77067 SCR MAMMO BI INCL CAD: CPT

## 2022-01-25 ENCOUNTER — TRANSCRIBE ORDERS (OUTPATIENT)
Dept: ADMINISTRATIVE | Facility: HOSPITAL | Age: 67
End: 2022-01-25

## 2022-01-25 DIAGNOSIS — R92.8 OTHER ABNORMAL AND INCONCLUSIVE FINDINGS ON DIAGNOSTIC IMAGING OF BREAST: Primary | ICD-10-CM

## 2022-02-09 ENCOUNTER — LAB (OUTPATIENT)
Dept: LAB | Facility: HOSPITAL | Age: 67
End: 2022-02-09

## 2022-02-09 ENCOUNTER — TRANSCRIBE ORDERS (OUTPATIENT)
Dept: ADMINISTRATIVE | Facility: HOSPITAL | Age: 67
End: 2022-02-09

## 2022-02-09 DIAGNOSIS — E66.3 OVERWEIGHT: Primary | ICD-10-CM

## 2022-02-09 DIAGNOSIS — E66.3 OVERWEIGHT: ICD-10-CM

## 2022-02-09 DIAGNOSIS — R53.83 OTHER FATIGUE: ICD-10-CM

## 2022-02-09 LAB
T-UPTAKE NFR SERPL: 1.06 TBI (ref 0.8–1.3)
T4 SERPL-MCNC: 6.79 MCG/DL (ref 4.5–11.7)
TSH SERPL DL<=0.05 MIU/L-ACNC: 2.35 UIU/ML (ref 0.27–4.2)

## 2022-02-09 PROCEDURE — 84436 ASSAY OF TOTAL THYROXINE: CPT

## 2022-02-09 PROCEDURE — 84479 ASSAY OF THYROID (T3 OR T4): CPT

## 2022-02-09 PROCEDURE — 84443 ASSAY THYROID STIM HORMONE: CPT

## 2022-02-09 PROCEDURE — 36415 COLL VENOUS BLD VENIPUNCTURE: CPT

## 2022-02-18 ENCOUNTER — HOSPITAL ENCOUNTER (OUTPATIENT)
Dept: MAMMOGRAPHY | Facility: HOSPITAL | Age: 67
Discharge: HOME | End: 2022-02-18

## 2022-02-18 ENCOUNTER — HOSPITAL ENCOUNTER (OUTPATIENT)
Dept: ULTRASOUND IMAGING | Facility: HOSPITAL | Age: 67
Discharge: HOME | End: 2022-02-18

## 2022-02-18 DIAGNOSIS — R92.8 OTHER ABNORMAL AND INCONCLUSIVE FINDINGS ON DIAGNOSTIC IMAGING OF BREAST: ICD-10-CM

## 2022-02-18 PROCEDURE — 77065 DX MAMMO INCL CAD UNI: CPT

## 2022-02-18 PROCEDURE — G0279 TOMOSYNTHESIS, MAMMO: HCPCS

## 2022-02-18 PROCEDURE — 76642 ULTRASOUND BREAST LIMITED: CPT

## 2025-07-10 ENCOUNTER — HOSPITAL ENCOUNTER (OUTPATIENT)
Facility: HOSPITAL | Age: 70
Discharge: HOME | End: 2025-07-10
Attending: EMERGENCY MEDICINE | Admitting: EMERGENCY MEDICINE
Payer: MEDICARE

## 2025-07-10 VITALS
WEIGHT: 180 LBS | HEIGHT: 65 IN | DIASTOLIC BLOOD PRESSURE: 81 MMHG | BODY MASS INDEX: 29.99 KG/M2 | HEART RATE: 81 BPM | SYSTOLIC BLOOD PRESSURE: 152 MMHG | TEMPERATURE: 98.2 F | RESPIRATION RATE: 18 BRPM | OXYGEN SATURATION: 97 %

## 2025-07-10 DIAGNOSIS — L02.91 ABSCESS: Primary | ICD-10-CM

## 2025-07-10 PROCEDURE — 87205 SMEAR GRAM STAIN: CPT | Performed by: EMERGENCY MEDICINE

## 2025-07-10 PROCEDURE — 99203 OFFICE O/P NEW LOW 30 MIN: CPT | Performed by: NURSE PRACTITIONER

## 2025-07-10 PROCEDURE — 10060 I&D ABSCESS SIMPLE/SINGLE: CPT | Performed by: NURSE PRACTITIONER

## 2025-07-10 PROCEDURE — G0463 HOSPITAL OUTPT CLINIC VISIT: HCPCS | Performed by: NURSE PRACTITIONER

## 2025-07-10 PROCEDURE — 87070 CULTURE OTHR SPECIMN AEROBIC: CPT | Performed by: EMERGENCY MEDICINE

## 2025-07-10 RX ORDER — CLINDAMYCIN HYDROCHLORIDE 300 MG/1
300 CAPSULE ORAL 3 TIMES DAILY
Qty: 21 CAPSULE | Refills: 0 | Status: SHIPPED | OUTPATIENT
Start: 2025-07-10 | End: 2025-07-17

## 2025-07-10 RX ORDER — CLINDAMYCIN HYDROCHLORIDE 150 MG/1
300 CAPSULE ORAL ONCE
Status: COMPLETED | OUTPATIENT
Start: 2025-07-10 | End: 2025-07-10

## 2025-07-10 RX ADMIN — CLINDAMYCIN HYDROCHLORIDE 300 MG: 150 CAPSULE ORAL at 14:29

## 2025-07-10 NOTE — FSED PROVIDER NOTE
Subjective   History of Present Illness  70 yr old female present with a swollen area on the upper right inner thigh that she thinks is a boil or insect bite that has gotten worse and now turning purplish.  Has had the area for 14 days.  It is tender.  She tried putting lavender oil on it and did not improve.  No fever or chills.  No ABD pain.  No N/V.        Review of Systems   Constitutional: Negative.    Respiratory: Negative.     Cardiovascular: Negative.    Gastrointestinal: Negative.    Genitourinary: Negative.    Skin:  Positive for wound.   Neurological: Negative.        Past Medical History:   Diagnosis Date    Daytime somnolence     Heart murmur     MOE on CPAP     Osteopenia     Primary osteoarthritis of right knee     Sinusitis        Allergies   Allergen Reactions    Bee Venom     Doxycycline     Penicillins        Past Surgical History:   Procedure Laterality Date    BREAST CYST ASPIRATION       SECTION      HERNIA REPAIR      HYSTERECTOMY      Total    OOPHORECTOMY         Family History   Problem Relation Age of Onset    Heart attack Other         acute    Skin cancer Other     Heart attack Other         acute    Skin cancer Other     Heart attack Other         acute    Skin cancer Other     Depression Mother     Bleeding Disorder Mother     Depression Father     Depression Other     Stroke Other     Breast cancer Neg Hx     Ovarian cancer Neg Hx        Social History     Socioeconomic History    Marital status:    Tobacco Use    Smoking status: Former     Current packs/day: 2.00     Average packs/day: 2.0 packs/day for 8.0 years (16.0 ttl pk-yrs)     Types: Cigarettes    Smokeless tobacco: Never   Substance and Sexual Activity    Alcohol use: No    Drug use: No    Sexual activity: Yes     Partners: Male           Objective   Physical Exam  Vitals and nursing note reviewed.   Constitutional:       Appearance: Normal appearance.   Abdominal:      General: Abdomen is protuberant. Bowel  "sounds are normal.      Palpations: Abdomen is soft.   Musculoskeletal:      Right upper leg: Swelling and tenderness present.        Legs:       Comments: Has a 1.5 inch diameter abscess that is soft on top and has a hard core underneath.  It is tender to touch. Reddish and purple looking with a couple of whitish head areas on top of it.  Slightly warm to touch.   Skin:     General: Skin is warm.      Capillary Refill: Capillary refill takes less than 2 seconds.   Neurological:      Mental Status: She is alert.         Incision & Drainage    Date/Time: 7/10/2025 2:00 PM    Performed by: Annette uLz APRN  Authorized by: Reid Hall DO    Consent:     Consent obtained:  Verbal    Consent given by:  Patient    Risks, benefits, and alternatives were discussed: yes      Risks discussed:  Bleeding, incomplete drainage and pain    Alternatives discussed:  No treatment and delayed treatment  Universal protocol:     Procedure explained and questions answered to patient or proxy's satisfaction: yes      Patient identity confirmed:  Verbally with patient and arm band  Location:     Type:  Abscess    Size:  1.5\"    Location:  Lower extremity    Lower extremity location:  Leg    Leg location:  R upper leg  Pre-procedure details:     Skin preparation:  Chlorhexidine  Sedation:     Sedation type:  None  Anesthesia:     Anesthesia method:  None  Procedure type:     Complexity:  Simple  Procedure details:     Ultrasound guidance: no      Needle aspiration: no      Incision types:  Single straight    Incision depth:  Dermal    Drainage:  Bloody and purulent    Drainage amount:  Moderate    Packing materials:  None  Post-procedure details:     Procedure completion:  Tolerated well, no immediate complications  Comments:      Wound culture sent.  Whitish pus with moderate amount of bloody drainage.               ED Course                                           Medical Decision Making  Diff Dx:  insect bite, boil, " abscess    Problems Addressed:  Abscess: complicated acute illness or injury    Amount and/or Complexity of Data Reviewed  Labs: ordered.    Risk  Prescription drug management.        Final diagnoses:   Abscess       ED Disposition  ED Disposition       ED Disposition   Discharge    Condition   Stable    Comment   --               PATIENT CONNECTION - [place] [id number]  [phone]    As needed, If symptoms worsen         Medication List        New Prescriptions      clindamycin 300 MG capsule  Commonly known as: CLEOCIN  Take 1 capsule by mouth 3 (Three) Times a Day for 7 days.            ASK your doctor about these medications      EPINEPHrine 0.3 MG/0.3ML solution auto-injector injection  Commonly known as: EpiPen 2-Lion  Inject 0.3 mL into the shoulder, thigh, or buttocks 1 (one) time for 1 dose. Inject intramuscularly as directed.  Ask about: Should I take this medication?               Where to Get Your Medications        These medications were sent to [id number] - [place], [place] - [address] AT [facility] [place], operated [facility] - [phone]  - [phone] fx [id number], [facility] [address]      Phone: [phone]   clindamycin 300 MG capsule

## 2025-07-13 LAB
BACTERIA SPEC AEROBE CULT: NORMAL
GRAM STN SPEC: NORMAL
GRAM STN SPEC: NORMAL

## 2025-07-18 ENCOUNTER — NURSE TRIAGE (OUTPATIENT)
Dept: CALL CENTER | Facility: HOSPITAL | Age: 70
End: 2025-07-18
Payer: MEDICARE

## 2025-07-18 NOTE — TELEPHONE ENCOUNTER
"Caller requesting test results of wound culture obtained at visit to [facility] on 07/10/2025 in order to make sure she is on correct antibiotic.   States wound is healing well to upper leg.    Caller transferred to [facility] staff as this nurse cannot give test results.    Reason for Disposition   Nursing judgment or information in reference    Additional Information   Negative: Nursing judgment   Negative: Information only call; adult is not ill or injured   Negative: Nursing judgment   Negative: Nursing judgment   Negative: Nursing judgment   Negative: Nursing judgment   Negative: Nursing judgment   Negative: Nursing judgment   Negative: Nursing judgment   Negative: Nursing judgment   Negative: Nursing judgment   Negative: Nursing judgment   Negative: Nursing judgment   Negative: Nursing judgment   Negative: Nursing judgment   Negative: Nursing judgment, per information in Reference   Negative: Information only call about a Well Adult (no illness or injury)   Negative: Caller can't be reached by phone   Negative: Nursing judgment or information in reference   Negative: Nursing judgment or information in reference   Negative: Nursing judgment or information in reference   Negative: Nursing judgment or information in reference   Negative: Nursing judgment or information in reference   Negative: Nursing judgment or information in reference   Negative: Nursing judgment or information in reference   Negative: Nursing judgment or information in reference    Answer Assessment - Initial Assessment Questions  1. REASON FOR CALL: \"What is your main concern right now?\"      Requesting wound culture report to make sure is on correct antibiotic  2. ONSET: \"When did the  start?\"      07/10/2025  3. SEVERITY: \"How bad is the ?\"      Healing well  4. FEVER: \"Do you have a fever?\"      no  5. OTHER SYMPTOMS: \"Do you have any other new symptoms?\"      N/A  6. TREATMENTS AND RESPONSE: \"What have you done so far to " "try to make this better? What medicines have you used?\"      Currently on Clindamycin  7. PREGNANCY: \"Is there any chance you are pregnant?\" \"When was your last menstrual period?\"     N/A    Protocols used: No Guideline or Reference Available-ADULT-AH, No Guideline Available-ADULT-AH    "